# Patient Record
Sex: MALE | Race: WHITE | NOT HISPANIC OR LATINO | Employment: FULL TIME | URBAN - METROPOLITAN AREA
[De-identification: names, ages, dates, MRNs, and addresses within clinical notes are randomized per-mention and may not be internally consistent; named-entity substitution may affect disease eponyms.]

---

## 2019-08-07 ENCOUNTER — HISTORICAL (OUTPATIENT)
Dept: LAB | Facility: HOSPITAL | Age: 54
End: 2019-08-07

## 2019-08-07 LAB
ABS NEUT (OLG): 4.17 X10(3)/MCL (ref 2.1–9.2)
ALBUMIN SERPL-MCNC: 4.2 GM/DL (ref 3.4–5)
ALP SERPL-CCNC: 55 UNIT/L (ref 46–116)
ALT SERPL-CCNC: 18 UNIT/L (ref 12–78)
AST SERPL-CCNC: 16 UNIT/L (ref 15–37)
BASOPHILS # BLD AUTO: 0 X10(3)/MCL (ref 0–0.2)
BASOPHILS NFR BLD AUTO: 0 %
BILIRUB SERPL-MCNC: 0.4 MG/DL (ref 0.2–1)
BILIRUBIN DIRECT+TOT PNL SERPL-MCNC: 0.11 MG/DL (ref 0–0.2)
BILIRUBIN DIRECT+TOT PNL SERPL-MCNC: 0.29 MG/DL (ref 0–0.8)
BUN SERPL-MCNC: 15.7 MG/DL (ref 7–18)
CALCIUM SERPL-MCNC: 9.4 MG/DL (ref 8.5–10.1)
CHLORIDE SERPL-SCNC: 102 MMOL/L (ref 98–107)
CHOLEST SERPL-MCNC: 160 MG/DL (ref 0–200)
CHOLEST/HDLC SERPL: 2.8 {RATIO} (ref 0–5)
CO2 SERPL-SCNC: 27.6 MMOL/L (ref 21–32)
CREAT SERPL-MCNC: 0.93 MG/DL (ref 0.6–1.3)
CREAT/UREA NIT SERPL: 17
EOSINOPHIL # BLD AUTO: 0.3 X10(3)/MCL (ref 0–0.9)
EOSINOPHIL NFR BLD AUTO: 4 %
ERYTHROCYTE [DISTWIDTH] IN BLOOD BY AUTOMATED COUNT: 13 % (ref 11.5–17)
FT4I SERPL CALC-MCNC: 2.31
GLUCOSE SERPL-MCNC: 97 MG/DL (ref 74–106)
HCT VFR BLD AUTO: 44.9 % (ref 42–52)
HDLC SERPL-MCNC: 57 MG/DL (ref 40–60)
HGB BLD-MCNC: 14.5 GM/DL (ref 14–18)
IMM GRANULOCYTES # BLD AUTO: 0.05 % (ref 0–0.02)
IMM GRANULOCYTES NFR BLD AUTO: 0.8 % (ref 0–0.43)
LDLC SERPL CALC-MCNC: 79 MG/DL (ref 0–129)
LYMPHOCYTES # BLD AUTO: 1.4 X10(3)/MCL (ref 0.6–4.6)
LYMPHOCYTES NFR BLD AUTO: 21 %
MCH RBC QN AUTO: 29.4 PG (ref 27–31)
MCHC RBC AUTO-ENTMCNC: 32.3 GM/DL (ref 33–36)
MCV RBC AUTO: 90.9 FL (ref 80–94)
MONOCYTES # BLD AUTO: 0.7 X10(3)/MCL (ref 0.1–1.3)
MONOCYTES NFR BLD AUTO: 10 %
NEUTROPHILS # BLD AUTO: 4.17 X10(3)/MCL (ref 1.4–7.9)
NEUTROPHILS NFR BLD AUTO: 64 %
PLATELET # BLD AUTO: 218 X10(3)/MCL (ref 130–400)
PMV BLD AUTO: 9.7 FL (ref 9.4–12.4)
POTASSIUM SERPL-SCNC: 5.1 MMOL/L (ref 3.5–5.1)
PROT SERPL-MCNC: 7.3 GM/DL (ref 6.4–8.2)
PSA SERPL-MCNC: 2.09 NG/ML (ref 0–4)
RBC # BLD AUTO: 4.94 X10(6)/MCL (ref 4.7–6.1)
SODIUM SERPL-SCNC: 140 MMOL/L (ref 136–145)
T3RU NFR SERPL: 33 % (ref 31–39)
T4 SERPL-MCNC: 7 MCG/DL (ref 4.7–13.3)
TRIGL SERPL-MCNC: 121 MG/DL
TSH SERPL-ACNC: 2.28 MIU/ML (ref 0.36–3.74)
VLDLC SERPL CALC-MCNC: 24 MG/DL
WBC # SPEC AUTO: 6.5 X10(3)/MCL (ref 4.5–11.5)

## 2020-11-06 ENCOUNTER — HISTORICAL (OUTPATIENT)
Dept: LAB | Facility: HOSPITAL | Age: 55
End: 2020-11-06

## 2020-11-06 LAB
ABS NEUT (OLG): 4.54 X10(3)/MCL (ref 2.1–9.2)
ALBUMIN SERPL-MCNC: 4.54 GM/DL (ref 3.4–5)
ALP SERPL-CCNC: 57 UNIT/L (ref 46–116)
ALT SERPL-CCNC: 21 UNIT/L (ref 12–78)
AST SERPL-CCNC: 18 UNIT/L (ref 15–37)
BASOPHILS # BLD AUTO: 0 X10(3)/MCL (ref 0–0.2)
BASOPHILS NFR BLD AUTO: 1 %
BILIRUB SERPL-MCNC: 0.4 MG/DL (ref 0.2–1)
BILIRUBIN DIRECT+TOT PNL SERPL-MCNC: 0.14 MG/DL (ref 0–0.2)
BILIRUBIN DIRECT+TOT PNL SERPL-MCNC: 0.26 MG/DL (ref 0–0.8)
BUN SERPL-MCNC: 14.5 MG/DL (ref 7–18)
CALCIUM SERPL-MCNC: 9 MG/DL (ref 8.5–10.1)
CHLORIDE SERPL-SCNC: 102 MMOL/L (ref 98–107)
CHOLEST SERPL-MCNC: 163 MG/DL (ref 0–200)
CHOLEST/HDLC SERPL: 2.7 {RATIO} (ref 0–5)
CO2 SERPL-SCNC: 27.4 MMOL/L (ref 21–32)
CREAT SERPL-MCNC: 1.07 MG/DL (ref 0.6–1.3)
CREAT/UREA NIT SERPL: 14 MG/DL (ref 12–14)
EOSINOPHIL # BLD AUTO: 0.2 X10(3)/MCL (ref 0–0.9)
EOSINOPHIL NFR BLD AUTO: 4 %
ERYTHROCYTE [DISTWIDTH] IN BLOOD BY AUTOMATED COUNT: 12.7 % (ref 11.5–17)
FT4I SERPL CALC-MCNC: 2.73
GLUCOSE SERPL-MCNC: 96 MG/DL (ref 74–106)
HCT VFR BLD AUTO: 48.2 % (ref 42–52)
HDLC SERPL-MCNC: 61 MG/DL (ref 40–60)
HGB BLD-MCNC: 15.5 GM/DL (ref 14–18)
IMM GRANULOCYTES # BLD AUTO: 0.05 % (ref 0–0.02)
IMM GRANULOCYTES NFR BLD AUTO: 0.7 % (ref 0–0.43)
LDLC SERPL CALC-MCNC: 71 MG/DL (ref 0–129)
LYMPHOCYTES # BLD AUTO: 1.2 X10(3)/MCL (ref 0.6–4.6)
LYMPHOCYTES NFR BLD AUTO: 17 %
MCH RBC QN AUTO: 30 PG (ref 27–31)
MCHC RBC AUTO-ENTMCNC: 32.2 GM/DL (ref 33–36)
MCV RBC AUTO: 93.2 FL (ref 80–94)
MONOCYTES # BLD AUTO: 0.7 X10(3)/MCL (ref 0.1–1.3)
MONOCYTES NFR BLD AUTO: 10 %
NEUTROPHILS # BLD AUTO: 4.54 X10(3)/MCL (ref 1.4–7.9)
NEUTROPHILS NFR BLD AUTO: 67 %
PLATELET # BLD AUTO: 261 X10(3)/MCL (ref 130–400)
PMV BLD AUTO: 10.5 FL (ref 9.4–12.4)
POTASSIUM SERPL-SCNC: 4.7 MMOL/L (ref 3.5–5.1)
PROT SERPL-MCNC: 7.5 GM/DL (ref 6.4–8.2)
PSA SERPL-MCNC: 1.16 NG/ML (ref 0–4)
RBC # BLD AUTO: 5.17 X10(6)/MCL (ref 4.7–6.1)
SODIUM SERPL-SCNC: 139 MMOL/L (ref 136–145)
T3RU NFR SERPL: 35 % (ref 31–39)
T4 SERPL-MCNC: 7.8 MCG/DL (ref 4.7–13.3)
TRIGL SERPL-MCNC: 153 MG/DL
TSH SERPL-ACNC: 1.02 MIU/ML (ref 0.36–3.74)
VLDLC SERPL CALC-MCNC: 31 MG/DL
WBC # SPEC AUTO: 6.8 X10(3)/MCL (ref 4.5–11.5)

## 2022-03-11 ENCOUNTER — HISTORICAL (OUTPATIENT)
Dept: LAB | Facility: HOSPITAL | Age: 57
End: 2022-03-11

## 2022-03-11 LAB
ABS NEUT (OLG): 3.66 (ref 2.1–9.2)
ALBUMIN SERPL-MCNC: 4 G/DL (ref 3.5–5)
ALP SERPL-CCNC: 60 U/L (ref 40–150)
ALT SERPL-CCNC: 33 U/L (ref 0–55)
AST SERPL-CCNC: 33 U/L (ref 5–34)
BASOPHILS # BLD AUTO: 0 10*3/UL (ref 0–0.2)
BASOPHILS NFR BLD AUTO: 1 %
BILIRUB SERPL-MCNC: 0.8 MG/DL
BILIRUBIN DIRECT+TOT PNL SERPL-MCNC: 0.3 (ref 0–0.5)
BILIRUBIN DIRECT+TOT PNL SERPL-MCNC: 0.5 (ref 0–0.8)
BUN SERPL-MCNC: 15.2 MG/DL (ref 8.4–25.7)
CALCIUM SERPL-MCNC: 9.1 MG/DL (ref 8.7–10.5)
CHLORIDE SERPL-SCNC: 103 MMOL/L (ref 98–107)
CHOLEST SERPL-MCNC: 156 MG/DL
CHOLEST/HDLC SERPL: 3 {RATIO} (ref 0–5)
CO2 SERPL-SCNC: 29 MMOL/L (ref 22–29)
CREAT SERPL-MCNC: 0.87 MG/DL (ref 0.73–1.18)
CREAT/UREA NIT SERPL: 17
EOSINOPHIL # BLD AUTO: 0.4 10*3/UL (ref 0–0.9)
EOSINOPHIL NFR BLD AUTO: 6 %
ERYTHROCYTE [DISTWIDTH] IN BLOOD BY AUTOMATED COUNT: 13.3 % (ref 11.5–17)
FT4I SERPL CALC-MCNC: 2.83 (ref 2.6–3.6)
GLUCOSE SERPL-MCNC: 99 MG/DL (ref 74–100)
HCT VFR BLD AUTO: 46.6 % (ref 42–52)
HDLC SERPL-MCNC: 51 MG/DL (ref 35–60)
HEMOLYSIS INTERF INDEX SERPL-ACNC: 6
HEMOLYSIS INTERF INDEX SERPL-ACNC: 6
HGB BLD-MCNC: 15.4 G/DL (ref 14–18)
ICTERIC INTERF INDEX SERPL-ACNC: 1
ICTERIC INTERF INDEX SERPL-ACNC: 1
IMM GRANULOCYTES # BLD AUTO: 0.07 10*3/UL (ref 0–0.02)
IMM GRANULOCYTES NFR BLD AUTO: 1.1 % (ref 0–0.43)
LDLC SERPL CALC-MCNC: 84 MG/DL (ref 50–140)
LIPEMIC INTERF INDEX SERPL-ACNC: 4
LIPEMIC INTERF INDEX SERPL-ACNC: 4
LYMPHOCYTES # BLD AUTO: 1.7 10*3/UL (ref 0.6–4.6)
LYMPHOCYTES NFR BLD AUTO: 26 %
MANUAL DIFF? (OHS): NO
MCH RBC QN AUTO: 29.7 PG (ref 27–31)
MCHC RBC AUTO-ENTMCNC: 33 G/DL (ref 33–36)
MCV RBC AUTO: 90 FL (ref 80–94)
MONOCYTES # BLD AUTO: 0.7 10*3/UL (ref 0.1–1.3)
MONOCYTES NFR BLD AUTO: 11 %
NEUTROPHILS # BLD AUTO: 3.66 10*3/UL (ref 1.4–7.9)
NEUTROPHILS NFR BLD AUTO: 55 %
PLATELET # BLD AUTO: 236 10*3/UL (ref 130–400)
PMV BLD AUTO: 9.8 FL (ref 9.4–12.4)
POTASSIUM SERPL-SCNC: 4.6 MMOL/L (ref 3.5–5.1)
PROT SERPL-MCNC: 7.2 G/DL (ref 6.4–8.3)
PSA SERPL-MCNC: 2.71 NG/ML
RBC # BLD AUTO: 5.18 10*6/UL (ref 4.7–6.1)
SODIUM SERPL-SCNC: 139 MMOL/L (ref 136–145)
T3RU NFR SERPL: 37.48 % (ref 31–39)
T4 SERPL-MCNC: 7.55 UG/DL (ref 4.87–11.72)
TRIGL SERPL-MCNC: 106 MG/DL (ref 34–140)
TSH SERPL-ACNC: 1.24 M[IU]/L (ref 0.35–4.94)
VLDLC SERPL CALC-MCNC: 21 MG/DL
WBC # SPEC AUTO: 6.6 10*3/UL (ref 4.5–11.5)

## 2023-03-06 ENCOUNTER — LAB VISIT (OUTPATIENT)
Dept: LAB | Facility: HOSPITAL | Age: 58
End: 2023-03-06
Attending: FAMILY MEDICINE
Payer: COMMERCIAL

## 2023-03-06 DIAGNOSIS — Z00.00 ROUTINE GENERAL MEDICAL EXAMINATION AT A HEALTH CARE FACILITY: Primary | ICD-10-CM

## 2023-03-06 DIAGNOSIS — Z12.5 SPECIAL SCREENING FOR MALIGNANT NEOPLASM OF PROSTATE: ICD-10-CM

## 2023-03-06 DIAGNOSIS — E78.5 HYPERLIPIDEMIA, UNSPECIFIED HYPERLIPIDEMIA TYPE: ICD-10-CM

## 2023-03-06 DIAGNOSIS — I10 ESSENTIAL HYPERTENSION, MALIGNANT: ICD-10-CM

## 2023-03-06 LAB
ALBUMIN SERPL-MCNC: 4.1 G/DL (ref 3.4–4.8)
ALP SERPL-CCNC: 62 UNIT/L (ref 40–150)
ALT SERPL-CCNC: 26 UNIT/L (ref 0–55)
ANION GAP SERPL CALC-SCNC: 11 MEQ/L
AST SERPL-CCNC: 25 UNIT/L (ref 5–34)
BASOPHILS # BLD AUTO: 0.04 X10(3)/MCL (ref 0–0.2)
BASOPHILS NFR BLD AUTO: 0.5 %
BILIRUBIN DIRECT+TOT PNL SERPL-MCNC: 0.1 MG/DL (ref 0–?)
BILIRUBIN DIRECT+TOT PNL SERPL-MCNC: 0.3 MG/DL (ref 0–0.8)
BILIRUBIN DIRECT+TOT PNL SERPL-MCNC: 0.4 MG/DL
BUN SERPL-MCNC: 15.1 MG/DL (ref 8.4–25.7)
CALCIUM SERPL-MCNC: 9 MG/DL (ref 8.8–10)
CHLORIDE SERPL-SCNC: 105 MMOL/L (ref 98–107)
CHOLEST SERPL-MCNC: 191 MG/DL
CHOLEST/HDLC SERPL: 4 {RATIO} (ref 0–5)
CO2 SERPL-SCNC: 23 MMOL/L (ref 23–31)
CREAT SERPL-MCNC: 0.92 MG/DL (ref 0.73–1.18)
CREAT/UREA NIT SERPL: 16
EOSINOPHIL # BLD AUTO: 0.46 X10(3)/MCL (ref 0–0.9)
EOSINOPHIL NFR BLD AUTO: 6.1 %
ERYTHROCYTE [DISTWIDTH] IN BLOOD BY AUTOMATED COUNT: 12.8 % (ref 11.5–17)
FT4I SERPL CALC-MCNC: 2.54 (ref 2.6–3.6)
GFR SERPLBLD CREATININE-BSD FMLA CKD-EPI: >60 MLS/MIN/1.73/M2
GLUCOSE SERPL-MCNC: 92 MG/DL (ref 82–115)
HCT VFR BLD AUTO: 47 % (ref 42–52)
HDLC SERPL-MCNC: 47 MG/DL (ref 35–60)
HGB BLD-MCNC: 15.5 G/DL (ref 14–18)
IMM GRANULOCYTES # BLD AUTO: 0.11 X10(3)/MCL (ref 0–0.04)
IMM GRANULOCYTES NFR BLD AUTO: 1.5 %
LDLC SERPL CALC-MCNC: 102 MG/DL (ref 50–140)
LYMPHOCYTES # BLD AUTO: 1.84 X10(3)/MCL (ref 0.6–4.6)
LYMPHOCYTES NFR BLD AUTO: 24.5 %
MCH RBC QN AUTO: 29.2 PG
MCHC RBC AUTO-ENTMCNC: 33 G/DL (ref 33–36)
MCV RBC AUTO: 88.5 FL (ref 80–94)
MONOCYTES # BLD AUTO: 0.84 X10(3)/MCL (ref 0.1–1.3)
MONOCYTES NFR BLD AUTO: 11.2 %
NEUTROPHILS # BLD AUTO: 4.21 X10(3)/MCL (ref 2.1–9.2)
NEUTROPHILS NFR BLD AUTO: 56.2 %
PLATELET # BLD AUTO: 253 X10(3)/MCL (ref 130–400)
PMV BLD AUTO: 10.1 FL (ref 7.4–10.4)
POTASSIUM SERPL-SCNC: 4 MMOL/L (ref 3.5–5.1)
PROT SERPL-MCNC: 7.3 GM/DL (ref 5.8–7.6)
PSA SERPL-MCNC: 1.06 NG/ML
RBC # BLD AUTO: 5.31 X10(6)/MCL (ref 4.7–6.1)
SODIUM SERPL-SCNC: 139 MMOL/L (ref 136–145)
T3RU NFR SERPL: 39.39 % (ref 31–39)
T4 SERPL-MCNC: 6.46 UG/DL (ref 4.87–11.72)
TRIGL SERPL-MCNC: 211 MG/DL (ref 34–140)
TSH SERPL-ACNC: 1.86 UIU/ML (ref 0.35–4.94)
VLDLC SERPL CALC-MCNC: 42 MG/DL
WBC # SPEC AUTO: 7.5 X10(3)/MCL (ref 4.5–11.5)

## 2023-03-06 PROCEDURE — 84436 ASSAY OF TOTAL THYROXINE: CPT

## 2023-03-06 PROCEDURE — 80076 HEPATIC FUNCTION PANEL: CPT

## 2023-03-06 PROCEDURE — 84479 ASSAY OF THYROID (T3 OR T4): CPT

## 2023-03-06 PROCEDURE — 84443 ASSAY THYROID STIM HORMONE: CPT

## 2023-03-06 PROCEDURE — 85025 COMPLETE CBC W/AUTO DIFF WBC: CPT

## 2023-03-06 PROCEDURE — 84153 ASSAY OF PSA TOTAL: CPT

## 2023-03-06 PROCEDURE — 36415 COLL VENOUS BLD VENIPUNCTURE: CPT

## 2023-03-06 PROCEDURE — 80061 LIPID PANEL: CPT

## 2023-03-06 PROCEDURE — 80048 BASIC METABOLIC PNL TOTAL CA: CPT

## 2023-04-19 ENCOUNTER — HOSPITAL ENCOUNTER (INPATIENT)
Facility: HOSPITAL | Age: 58
LOS: 2 days | Discharge: HOME | DRG: 603 | End: 2023-04-21
Attending: EMERGENCY MEDICINE | Admitting: STUDENT IN AN ORGANIZED HEALTH CARE EDUCATION/TRAINING PROGRAM
Payer: COMMERCIAL

## 2023-04-19 DIAGNOSIS — R07.9 CHEST PAIN: ICD-10-CM

## 2023-04-19 DIAGNOSIS — L08.9 WOUND INFECTION: Primary | ICD-10-CM

## 2023-04-19 DIAGNOSIS — T14.8XXA WOUND INFECTION: Primary | ICD-10-CM

## 2023-04-19 LAB
ALBUMIN SERPL-MCNC: 4.3 G/DL (ref 3.4–4.8)
ALBUMIN/GLOB SERPL: 1.5 RATIO (ref 1.1–2)
ALP SERPL-CCNC: 67 UNIT/L (ref 40–150)
ALT SERPL-CCNC: 24 UNIT/L (ref 0–55)
AST SERPL-CCNC: 29 UNIT/L (ref 5–34)
BASOPHILS # BLD AUTO: 0.07 X10(3)/MCL (ref 0–0.2)
BASOPHILS NFR BLD AUTO: 1 %
BILIRUBIN DIRECT+TOT PNL SERPL-MCNC: 0.5 MG/DL
BUN SERPL-MCNC: 13.6 MG/DL (ref 8.4–25.7)
CALCIUM SERPL-MCNC: 9 MG/DL (ref 8.8–10)
CHLORIDE SERPL-SCNC: 106 MMOL/L (ref 98–107)
CO2 SERPL-SCNC: 20 MMOL/L (ref 23–31)
CREAT SERPL-MCNC: 0.9 MG/DL (ref 0.73–1.18)
EOSINOPHIL # BLD AUTO: 0.3 X10(3)/MCL (ref 0–0.9)
EOSINOPHIL NFR BLD AUTO: 4.3 %
ERYTHROCYTE [DISTWIDTH] IN BLOOD BY AUTOMATED COUNT: 13.2 % (ref 11.5–17)
GFR SERPLBLD CREATININE-BSD FMLA CKD-EPI: >60 MLS/MIN/1.73/M2
GLOBULIN SER-MCNC: 2.9 GM/DL (ref 2.4–3.5)
GLUCOSE SERPL-MCNC: 118 MG/DL (ref 82–115)
HCT VFR BLD AUTO: 46.1 % (ref 42–52)
HGB BLD-MCNC: 15.3 G/DL (ref 14–18)
IMM GRANULOCYTES # BLD AUTO: 0.04 X10(3)/MCL (ref 0–0.04)
IMM GRANULOCYTES NFR BLD AUTO: 0.6 %
LACTATE SERPL-SCNC: 0.9 MMOL/L (ref 0.5–2.2)
LYMPHOCYTES # BLD AUTO: 1.48 X10(3)/MCL (ref 0.6–4.6)
LYMPHOCYTES NFR BLD AUTO: 21.1 %
MCH RBC QN AUTO: 29.8 PG (ref 27–31)
MCHC RBC AUTO-ENTMCNC: 33.2 G/DL (ref 33–36)
MCV RBC AUTO: 89.7 FL (ref 80–94)
MONOCYTES # BLD AUTO: 0.87 X10(3)/MCL (ref 0.1–1.3)
MONOCYTES NFR BLD AUTO: 12.4 %
NEUTROPHILS # BLD AUTO: 4.24 X10(3)/MCL (ref 2.1–9.2)
NEUTROPHILS NFR BLD AUTO: 60.6 %
NRBC BLD AUTO-RTO: 0 %
PLATELET # BLD AUTO: 240 X10(3)/MCL (ref 130–400)
PMV BLD AUTO: 10.2 FL (ref 7.4–10.4)
POTASSIUM SERPL-SCNC: 4.2 MMOL/L (ref 3.5–5.1)
PROT SERPL-MCNC: 7.2 GM/DL (ref 5.8–7.6)
RBC # BLD AUTO: 5.14 X10(6)/MCL (ref 4.7–6.1)
SODIUM SERPL-SCNC: 136 MMOL/L (ref 136–145)
WBC # SPEC AUTO: 7 X10(3)/MCL (ref 4.5–11.5)

## 2023-04-19 PROCEDURE — 87040 BLOOD CULTURE FOR BACTERIA: CPT | Performed by: NURSE PRACTITIONER

## 2023-04-19 PROCEDURE — 63600175 PHARM REV CODE 636 W HCPCS: Performed by: NURSE PRACTITIONER

## 2023-04-19 PROCEDURE — 99285 EMERGENCY DEPT VISIT HI MDM: CPT

## 2023-04-19 PROCEDURE — 87070 CULTURE OTHR SPECIMN AEROBIC: CPT | Performed by: NURSE PRACTITIONER

## 2023-04-19 PROCEDURE — 11000001 HC ACUTE MED/SURG PRIVATE ROOM

## 2023-04-19 PROCEDURE — 25000003 PHARM REV CODE 250: Performed by: NURSE PRACTITIONER

## 2023-04-19 PROCEDURE — 80053 COMPREHEN METABOLIC PANEL: CPT | Performed by: NURSE PRACTITIONER

## 2023-04-19 PROCEDURE — 85025 COMPLETE CBC W/AUTO DIFF WBC: CPT | Performed by: NURSE PRACTITIONER

## 2023-04-19 PROCEDURE — 83605 ASSAY OF LACTIC ACID: CPT | Performed by: NURSE PRACTITIONER

## 2023-04-19 RX ORDER — ONDANSETRON 2 MG/ML
4 INJECTION INTRAMUSCULAR; INTRAVENOUS EVERY 6 HOURS PRN
Status: DISCONTINUED | OUTPATIENT
Start: 2023-04-20 | End: 2023-04-21 | Stop reason: HOSPADM

## 2023-04-19 RX ORDER — IBUPROFEN 200 MG
24 TABLET ORAL
Status: DISCONTINUED | OUTPATIENT
Start: 2023-04-20 | End: 2023-04-21 | Stop reason: HOSPADM

## 2023-04-19 RX ORDER — ACETAMINOPHEN 325 MG/1
650 TABLET ORAL EVERY 4 HOURS PRN
Status: DISCONTINUED | OUTPATIENT
Start: 2023-04-20 | End: 2023-04-21 | Stop reason: HOSPADM

## 2023-04-19 RX ORDER — DICLOFENAC SODIUM 75 MG/1
75 TABLET, DELAYED RELEASE ORAL 2 TIMES DAILY
Status: ON HOLD | COMMUNITY
Start: 2023-02-02 | End: 2023-04-21 | Stop reason: HOSPADM

## 2023-04-19 RX ORDER — SODIUM CHLORIDE 0.9 % (FLUSH) 0.9 %
10 SYRINGE (ML) INJECTION EVERY 12 HOURS PRN
Status: DISCONTINUED | OUTPATIENT
Start: 2023-04-20 | End: 2023-04-21 | Stop reason: HOSPADM

## 2023-04-19 RX ORDER — HYDROCODONE BITARTRATE AND ACETAMINOPHEN 5; 325 MG/1; MG/1
1 TABLET ORAL EVERY 6 HOURS PRN
Status: DISCONTINUED | OUTPATIENT
Start: 2023-04-20 | End: 2023-04-21 | Stop reason: HOSPADM

## 2023-04-19 RX ORDER — MORPHINE SULFATE 4 MG/ML
4 INJECTION, SOLUTION INTRAMUSCULAR; INTRAVENOUS EVERY 4 HOURS PRN
Status: DISCONTINUED | OUTPATIENT
Start: 2023-04-20 | End: 2023-04-21 | Stop reason: HOSPADM

## 2023-04-19 RX ORDER — SIMETHICONE 80 MG
1 TABLET,CHEWABLE ORAL 4 TIMES DAILY PRN
Status: DISCONTINUED | OUTPATIENT
Start: 2023-04-20 | End: 2023-04-21 | Stop reason: HOSPADM

## 2023-04-19 RX ORDER — NALOXONE HCL 0.4 MG/ML
0.02 VIAL (ML) INJECTION
Status: DISCONTINUED | OUTPATIENT
Start: 2023-04-20 | End: 2023-04-21 | Stop reason: HOSPADM

## 2023-04-19 RX ORDER — TALC
6 POWDER (GRAM) TOPICAL NIGHTLY PRN
Status: DISCONTINUED | OUTPATIENT
Start: 2023-04-20 | End: 2023-04-21 | Stop reason: HOSPADM

## 2023-04-19 RX ORDER — AMLODIPINE AND BENAZEPRIL HYDROCHLORIDE 10; 40 MG/1; MG/1
1 CAPSULE ORAL DAILY
COMMUNITY
Start: 2023-03-15

## 2023-04-19 RX ORDER — IBUPROFEN 200 MG
16 TABLET ORAL
Status: DISCONTINUED | OUTPATIENT
Start: 2023-04-20 | End: 2023-04-21 | Stop reason: HOSPADM

## 2023-04-19 RX ORDER — ENOXAPARIN SODIUM 100 MG/ML
40 INJECTION SUBCUTANEOUS EVERY 24 HOURS
Status: DISCONTINUED | OUTPATIENT
Start: 2023-04-20 | End: 2023-04-21 | Stop reason: HOSPADM

## 2023-04-19 RX ORDER — GLUCAGON 1 MG
1 KIT INJECTION
Status: DISCONTINUED | OUTPATIENT
Start: 2023-04-20 | End: 2023-04-21 | Stop reason: HOSPADM

## 2023-04-19 RX ORDER — VANCOMYCIN HCL IN 5 % DEXTROSE 1G/250ML
2000 PLASTIC BAG, INJECTION (ML) INTRAVENOUS ONCE
Status: COMPLETED | OUTPATIENT
Start: 2023-04-19 | End: 2023-04-19

## 2023-04-19 RX ADMIN — DOXYCYCLINE 100 MG: 100 INJECTION, POWDER, LYOPHILIZED, FOR SOLUTION INTRAVENOUS at 07:04

## 2023-04-19 RX ADMIN — PIPERACILLIN AND TAZOBACTAM 4.5 G: 4; .5 INJECTION, POWDER, LYOPHILIZED, FOR SOLUTION INTRAVENOUS; PARENTERAL at 06:04

## 2023-04-19 RX ADMIN — VANCOMYCIN HYDROCHLORIDE 2000 MG: 1 INJECTION, POWDER, LYOPHILIZED, FOR SOLUTION INTRAVENOUS at 09:04

## 2023-04-19 NOTE — PROGRESS NOTES
Pharmacokinetic Initial Assessment: IV Vancomycin    Assessment/Plan:    Initiate intravenous vancomycin with loading dose of 2000 mg once followed by a maintenance dose of vancomycin 1250mg IV every 12 hours  Desired empiric serum trough concentration is 15 to 20 mcg/mL  Draw vancomycin trough level 60 min prior to fourth dose on 04/21 at approximately 0600  Pharmacy will continue to follow and monitor vancomycin.      Please contact pharmacy at extension [phone] with any questions regarding this assessment.     Thank you for the consult,   Cheyenne Atkinson       Patient brief summary:  Efe Griffin is a 58 y.o. male initiated on antimicrobial therapy with IV Vancomycin for treatment of suspected skin & soft tissue infection    Drug Allergies:   Review of patient's allergies indicates:  No Known Allergies    Actual Body Weight:   92.5 kg    Renal Function:   Estimated Creatinine Clearance: 89.5 mL/min (based on SCr of 0.9 mg/dL).,     Dialysis Method (if applicable):  N/A    CBC (last 72 hours):  Recent Labs   Lab Result Units 04/19/23  1328   WBC x10(3)/mcL 7.0   Hgb g/dL 15.3   Hct % 46.1   Platelet x10(3)/mcL 240   Mono % % 12.4   Eos % % 4.3   Basophil % % 1.0       Metabolic Panel (last 72 hours):  Recent Labs   Lab Result Units 04/19/23  1328   Sodium Level mmol/L 136   Potassium Level mmol/L 4.2   Chloride mmol/L 106   Carbon Dioxide mmol/L 20*   Glucose Level mg/dL 118*   Blood Urea Nitrogen mg/dL 13.6   Creatinine mg/dL 0.90   Albumin Level g/dL 4.3   Bilirubin Total mg/dL 0.5   Alkaline Phosphatase unit/L 67   Aspartate Aminotransferase unit/L 29   Alanine Aminotransferase unit/L 24       Drug levels (last 3 results):  No results for input(s): VANCOMYCINRA, VANCORANDOM, VANCOMYCINPE, VANCOPEAK, VANCOMYCINTR, VANCOTROUGH in the last 72 hours.    Microbiologic Results:  Microbiology Results (last 7 days)       Procedure Component Value Units Date/Time    Wound Culture [[id number]]     Order Status: Sent Specimen:  Wound from Finger, Right Hand     Blood Culture [[id number]] Collected: 04/19/23 1328    Order Status: Resulted Specimen: Blood Updated: 04/19/23 1343    Blood Culture [[id number]] Collected: 04/19/23 1328    Order Status: Resulted Specimen: Blood Updated: 04/19/23 1343

## 2023-04-19 NOTE — FIRST PROVIDER EVALUATION
Medical screening examination initiated.  I have conducted a focused provider triage encounter, findings are as follows:    Brief history of present illness:  Patient that he was bitten by a crawfish x 3 days ago. States right fifth finger redness, swelling, and pain.     Vitals:    04/19/23 1244   BP: (!) 143/90   Pulse: 63   Resp: 18   Temp: 98.4 °F (36.9 °C)   TempSrc: Oral   SpO2: 97%       Pertinent physical exam:  Awake, alert, ambulatory      Brief workup plan:  Labs, Imaging.    Preliminary workup initiated; this workup will be continued and followed by the physician or advanced practice provider that is assigned to the patient when roomed.

## 2023-04-19 NOTE — ED PROVIDER NOTES
Encounter Date: 4/19/2023       History     Chief Complaint   Patient presents with    Wound Infection     POV with wound to R pinky finger from crawfishing equipment. Denies fevers. Seen at clinic today, given tetanus, and told to go to ER for potential I&D     See MDM    The history is provided by the patient. No  was used.   Review of patient's allergies indicates:  No Known Allergies  Past Medical History:   Diagnosis Date    Hypertension      Past Surgical History:   Procedure Laterality Date    KNEE SURGERY      SINUS SURGERY       History reviewed. No pertinent family history.  Social History     Tobacco Use    Smoking status: Former     Types: Cigarettes    Smokeless tobacco: Never   Substance Use Topics    Alcohol use: Yes     Alcohol/week: 6.0 standard drinks     Types: 6 Cans of beer per week     Comment: daily    Drug use: Never     Review of Systems   Constitutional:  Negative for fever.   Respiratory:  Negative for cough and shortness of breath.    Cardiovascular:  Negative for chest pain.   Gastrointestinal:  Negative for abdominal pain.   Genitourinary:  Negative for difficulty urinating and dysuria.   Musculoskeletal:  Negative for gait problem.   Skin:  Negative for color change.   Neurological:  Negative for dizziness, speech difficulty and headaches.   Psychiatric/Behavioral:  Negative for hallucinations and suicidal ideas.    All other systems reviewed and are negative.    Physical Exam     Initial Vitals [04/19/23 1244]   BP Pulse Resp Temp SpO2   (!) 143/90 63 18 98.4 °F (36.9 °C) 97 %      MAP       --         Physical Exam    Nursing note and vitals reviewed.  Constitutional: He appears well-developed and well-nourished.   HENT:   Head: Normocephalic.   Eyes: EOM are normal.   Neck: Neck supple.   Normal range of motion.  Cardiovascular:  Normal rate, regular rhythm, normal heart sounds and intact distal pulses.           Pulmonary/Chest: Breath sounds normal.    Abdominal: Abdomen is soft. Bowel sounds are normal.   Musculoskeletal:         General: Normal range of motion.      Cervical back: Normal range of motion and neck supple.     Neurological: He is alert and oriented to person, place, and time. He has normal strength.   Skin: Skin is warm and dry. Capillary refill takes less than 2 seconds.   Swelling and redness to right little finger    Psychiatric: He has a normal mood and affect. His behavior is normal. Judgment and thought content normal.       ED Course   Procedures  Labs Reviewed   COMPREHENSIVE METABOLIC PANEL - Abnormal; Notable for the following components:       Result Value    Carbon Dioxide 20 (*)     Glucose Level 118 (*)     All other components within normal limits   LACTIC ACID, PLASMA - Normal   BLOOD CULTURE [facility]   BLOOD CULTURE [facility]   WOUND CULTURE ([facility])   CBC W/ AUTO DIFFERENTIAL    Narrative:     The following orders were created for panel order CBC Auto Differential.  Procedure                               Abnormality         Status                     ---------                               -----------         ------                     CBC with Differential[[id number]]                            Final result                 Please view results for these tests on the individual orders.   CBC WITH DIFFERENTIAL          Imaging Results              X-Ray Hand 3 view Right (Final result)  Result time 04/19/23 13:50:49      Final result by Rodolfo Nelson MD (04/19/23 13:50:49)                   Impression:      No acute osseous abnormality, fracture, or dislocation.    Mild degenerative changes.  Predominantly of the proximal interphalangeal joints.      Electronically signed by: Rodolfo Nelson  Date:    04/19/2023  Time:    13:50               Narrative:    EXAMINATION:  XR HAND COMPLETE 3 VIEW RIGHT    CLINICAL HISTORY:  Swelling;    TECHNIQUE:  Radiographs of the right hand with AP, lateral and oblique  views.    COMPARISON:  No prior  imaging available for comparison    FINDINGS:  There is no acute fracture, subluxation or dislocation.    Degenerative changes with osteophytes greatest of the 5th proximal interphalangeal joint.    Additional osteophytes are noted about the 3rd and 4th proximal interphalangeal joints.    Osseous structures show normal bone mineral density.    Soft tissues are unremarkable.    There are no radiopaque foreign bodies.                                       Medications   piperacillin-tazobactam (ZOSYN) 4.5 g in dextrose 5 % in water (D5W) 5 % 100 mL IVPB (MB+) (has no administration in time range)   doxycycline (VIBRAMYCIN) 100mg in dextrose 5% 250 mL IVPB (ready to mix) (has no administration in time range)   piperacillin-tazobactam (ZOSYN) 4.5 g in dextrose 5 % in water (D5W) 5 % 100 mL IVPB (MB+) (has no administration in time range)   vancomycin - pharmacy to dose (has no administration in time range)   vancomycin in dextrose 5 % 1 gram/250 mL IVPB 2,000 mg (has no administration in time range)   vancomycin 1.25 g in dextrose 5% 250 mL IVPB (ready to mix) (has no administration in time range)     Medical Decision Making:   Initial Assessment:   Historian:  Patient.  Patient is a 58-year-old male  that presents with infection to right little finger that has been present started 6 days ago. Associated symptoms nothing. Surrounding information is patient was crawfishing and had an injury to the finger. Exacerbated by nothing. Relieved by nothing. Patient treatment prior to arrival none. Risk factors include none. Other history pertaining to this complaint nothing.   Assessment:  See physical exam.    Differential Diagnosis:   Finger cellulitis, finger abscess, flexor tenosynovitis  ED Management:  History was obtained.  Physical was performed.  I did speak to orthopedist Dr. Flores who is on for hand call.  He diverted this to plastics.  I spoke to Dr. Gonzalez plastic surgeon and he accepts consult on this patient.  He  would like patient admitted to Hospital Medicine.  Due to the brackish water I did speak to the pharmacist.  Culture was obtained from the infected finger.  Blood cultures were drawn.  We will start patient on vancomycin, Zosyn, and doxycycline.  Spoke to Madi with Hospital Medicine and they did accept the admission.  No social determinants that affect his healthcare were noted.  Discussed case with emergency room physician Dr Sharma.  She will see the patient and she agrees with admission          Attending Attestation:     Physician Attestation Statement for NP/PA:   I have conducted a face to face encounter with this patient in addition to the NP/PA, due to Medical Complexity    Other NP/PA Attestation Additions:    History of Present Illness: See ed course             ED Course as of 04/19/23 1820 Wed Apr 19, 2023 1807 Dr. Sharma supervisory attestation  I performed substantive portion of MDM. I performed a history, physical exam, reviewed pertinent available previous records and discussed plan of care with NP I had face to face time evaluation of the patient    HPI: 57 yo M with h/o HTN, HLD here fore evaluation of right pinky swellinga fter crawfishing Friday and sustaining puncture wound. Wrosening over several days, multiple infections in the past, no fever, able to range his hand  PE:normocephalic, atraumatic  Regular rate, lungs clear, good cap refill  Right fifth digit with dorsal erythema, induration, some purple discloration, no fusiform swelling, no canaval signs  MDM:plastics recommends admit to medicine. Vanc, zosyn and doxy to cover for organisms in brackish water   [BS]      ED Course User Index  [BS] Monica Sharma MD                   Clinical Impression:   Final diagnoses:  [T14.8XXA, L08.9] Wound infection - Right little finger (Primary)        ED Disposition Condition    Admit Stable                JOSE ANGEL Donato  04/19/23 1820       Monica Sharma MD  04/19/23  1911

## 2023-04-20 LAB
ALBUMIN SERPL-MCNC: 3.7 G/DL (ref 3.4–4.8)
ALBUMIN/GLOB SERPL: 1.5 RATIO (ref 1.1–2)
ALP SERPL-CCNC: 57 UNIT/L (ref 40–150)
ALT SERPL-CCNC: 22 UNIT/L (ref 0–55)
AST SERPL-CCNC: 22 UNIT/L (ref 5–34)
BASOPHILS # BLD AUTO: 0.04 X10(3)/MCL (ref 0–0.2)
BASOPHILS NFR BLD AUTO: 0.6 %
BILIRUBIN DIRECT+TOT PNL SERPL-MCNC: 0.3 MG/DL
BUN SERPL-MCNC: 14.1 MG/DL (ref 8.4–25.7)
CALCIUM SERPL-MCNC: 8.9 MG/DL (ref 8.8–10)
CHLORIDE SERPL-SCNC: 107 MMOL/L (ref 98–107)
CO2 SERPL-SCNC: 25 MMOL/L (ref 23–31)
CREAT SERPL-MCNC: 0.9 MG/DL (ref 0.73–1.18)
EOSINOPHIL # BLD AUTO: 0.3 X10(3)/MCL (ref 0–0.9)
EOSINOPHIL NFR BLD AUTO: 4.6 %
ERYTHROCYTE [DISTWIDTH] IN BLOOD BY AUTOMATED COUNT: 13.2 % (ref 11.5–17)
GFR SERPLBLD CREATININE-BSD FMLA CKD-EPI: >60 MLS/MIN/1.73/M2
GLOBULIN SER-MCNC: 2.4 GM/DL (ref 2.4–3.5)
GLUCOSE SERPL-MCNC: 105 MG/DL (ref 82–115)
HCT VFR BLD AUTO: 44.3 % (ref 42–52)
HGB BLD-MCNC: 14.5 G/DL (ref 14–18)
IMM GRANULOCYTES # BLD AUTO: 0.03 X10(3)/MCL (ref 0–0.04)
IMM GRANULOCYTES NFR BLD AUTO: 0.5 %
LYMPHOCYTES # BLD AUTO: 1.66 X10(3)/MCL (ref 0.6–4.6)
LYMPHOCYTES NFR BLD AUTO: 25.3 %
MCH RBC QN AUTO: 29.7 PG (ref 27–31)
MCHC RBC AUTO-ENTMCNC: 32.7 G/DL (ref 33–36)
MCV RBC AUTO: 90.6 FL (ref 80–94)
MONOCYTES # BLD AUTO: 0.9 X10(3)/MCL (ref 0.1–1.3)
MONOCYTES NFR BLD AUTO: 13.7 %
MRSA PCR SCRN (OHS): NOT DETECTED
NEUTROPHILS # BLD AUTO: 3.64 X10(3)/MCL (ref 2.1–9.2)
NEUTROPHILS NFR BLD AUTO: 55.3 %
NRBC BLD AUTO-RTO: 0 %
PLATELET # BLD AUTO: 229 X10(3)/MCL (ref 130–400)
PMV BLD AUTO: 10 FL (ref 7.4–10.4)
POTASSIUM SERPL-SCNC: 4.2 MMOL/L (ref 3.5–5.1)
PROT SERPL-MCNC: 6.1 GM/DL (ref 5.8–7.6)
RBC # BLD AUTO: 4.89 X10(6)/MCL (ref 4.7–6.1)
SODIUM SERPL-SCNC: 139 MMOL/L (ref 136–145)
WBC # SPEC AUTO: 6.6 X10(3)/MCL (ref 4.5–11.5)

## 2023-04-20 PROCEDURE — 85025 COMPLETE CBC W/AUTO DIFF WBC: CPT | Performed by: NURSE PRACTITIONER

## 2023-04-20 PROCEDURE — 99223 1ST HOSP IP/OBS HIGH 75: CPT | Mod: ,,, | Performed by: SURGERY

## 2023-04-20 PROCEDURE — 80053 COMPREHEN METABOLIC PANEL: CPT | Performed by: NURSE PRACTITIONER

## 2023-04-20 PROCEDURE — 63600175 PHARM REV CODE 636 W HCPCS: Performed by: NURSE PRACTITIONER

## 2023-04-20 PROCEDURE — 99223 PR INITIAL HOSPITAL CARE,LEVL III: ICD-10-PCS | Mod: ,,, | Performed by: SURGERY

## 2023-04-20 PROCEDURE — 25000003 PHARM REV CODE 250: Performed by: INTERNAL MEDICINE

## 2023-04-20 PROCEDURE — 11000001 HC ACUTE MED/SURG PRIVATE ROOM

## 2023-04-20 PROCEDURE — 87641 MR-STAPH DNA AMP PROBE: CPT | Performed by: INTERNAL MEDICINE

## 2023-04-20 PROCEDURE — 25000003 PHARM REV CODE 250: Performed by: NURSE PRACTITIONER

## 2023-04-20 RX ORDER — DOXYCYCLINE HYCLATE 100 MG
100 TABLET ORAL EVERY 12 HOURS
Status: DISCONTINUED | OUTPATIENT
Start: 2023-04-20 | End: 2023-04-21

## 2023-04-20 RX ADMIN — DOXYCYCLINE HYCLATE 100 MG: 100 TABLET, COATED ORAL at 08:04

## 2023-04-20 RX ADMIN — VANCOMYCIN HYDROCHLORIDE 1250 MG: 1.25 INJECTION, POWDER, LYOPHILIZED, FOR SOLUTION INTRAVENOUS at 06:04

## 2023-04-20 RX ADMIN — PIPERACILLIN AND TAZOBACTAM 4.5 G: 4; .5 INJECTION, POWDER, FOR SOLUTION INTRAVENOUS; PARENTERAL at 03:04

## 2023-04-20 RX ADMIN — DOXYCYCLINE 100 MG: 100 INJECTION, POWDER, LYOPHILIZED, FOR SOLUTION INTRAVENOUS at 08:04

## 2023-04-20 RX ADMIN — PIPERACILLIN AND TAZOBACTAM 4.5 G: 4; .5 INJECTION, POWDER, FOR SOLUTION INTRAVENOUS; PARENTERAL at 07:04

## 2023-04-20 RX ADMIN — PIPERACILLIN AND TAZOBACTAM 4.5 G: 4; .5 INJECTION, POWDER, FOR SOLUTION INTRAVENOUS; PARENTERAL at 12:04

## 2023-04-20 RX ADMIN — ENOXAPARIN SODIUM 40 MG: 40 INJECTION SUBCUTANEOUS at 04:04

## 2023-04-20 NOTE — NURSING
Nurses Note -- 4 Eyes      4/19/2023   11:28 PM      Skin assessed during: Admit      [x] No Pressure Injuries Present    []Prevention Measures Documented      [] Yes- Altered Skin Integrity Present or Discovered   [] LDA Added if Not in Epic (Describe Wound)   [] New Altered Skin Integrity was Present on Admit and Documented in LDA   [] Wound Image Taken    Wound Care Consulted? No    Attending Nurse:  Venessa Hilton RN     Second RN/Staff Member:  Awilda Harmon RN

## 2023-04-20 NOTE — SUBJECTIVE & OBJECTIVE
No current facility-administered medications on file prior to encounter.     Current Outpatient Medications on File Prior to Encounter   Medication Sig    amLODIPine-benazepriL (LOTREL) 10-40 mg per capsule Take 1 capsule by mouth once daily.    aspirin 81 mg Cap Take 81 mg by mouth once daily.    atorvastatin calcium (LIPITOR ORAL) Take 10 mg by mouth once.    diclofenac (VOLTAREN) 75 MG EC tablet Take 75 mg by mouth 2 (two) times a day.       Review of patient's allergies indicates:  No Known Allergies    Past Medical History:   Diagnosis Date    Hypercholesterolemia     Hypertension      Past Surgical History:   Procedure Laterality Date    KNEE SURGERY      SINUS SURGERY       Family History    None       Tobacco Use    Smoking status: Former     Types: Cigarettes    Smokeless tobacco: Never   Substance and Sexual Activity    Alcohol use: Yes     Alcohol/week: 6.0 standard drinks     Types: 6 Cans of beer per week     Comment: daily    Drug use: Never    Sexual activity: Not on file     Review of Systems  Negative except as detailed in HPI, all other systems reviewed and negative.     Objective:     Vital Signs (Most Recent):  Temp: 97.9 °F (36.6 °C) (04/20/23 0505)  Pulse: (!) 51 (04/20/23 0505)  Resp: 18 (04/19/23 2306)  BP: (!) 166/88 (04/20/23 0505)  SpO2: 96 % (04/20/23 0505)   Vital Signs (24h Range):  Temp:  [97.7 °F (36.5 °C)-98.4 °F (36.9 °C)] 97.9 °F (36.6 °C)  Pulse:  [50-63] 51  Resp:  [16-18] 18  SpO2:  [95 %-99 %] 96 %  BP: (129-166)/(72-93) 166/88     Weight: 88.8 kg (195 lb 12.3 oz)  Body mass index is 33.6 kg/m².    Physical Exam  Constitutional:       Appearance: Normal appearance.      Comments: Pleasant male, NAD   HENT:      Head: Normocephalic and atraumatic.   Eyes:      Extraocular Movements: Extraocular movements intact.   Pulmonary:      Effort: Pulmonary effort is normal.   Musculoskeletal:         General: Normal range of motion.      Cervical back: Normal range of motion.   Skin:      General: Skin is warm and dry.      Capillary Refill: Capillary refill takes less than 2 seconds.      Comments: Right 5th finger with a small opening laterally, no purulent drainage, no fluctuance, erythema with some blistering to the finger. Sensation and ROM intact.    Neurological:      General: No focal deficit present.      Mental Status: He is oriented to person, place, and time.                 Significant Labs:  CBC:   Recent Labs   Lab 04/20/23  0351   WBC 6.6   RBC 4.89   HGB 14.5   HCT 44.3      MCV 90.6   MCH 29.7   MCHC 32.7*     BMP:   Recent Labs   Lab 04/20/23  0351      K 4.2   CO2 25   BUN 14.1   CREATININE 0.90   CALCIUM 8.9       Significant Diagnostics:  Xray right hand 4/19/23:    Impression:     No acute osseous abnormality, fracture, or dislocation.     Mild degenerative changes.  Predominantly of the proximal interphalangeal joints.    ASSESSMENT:   Right 5th finger cellulitis   HTN  HLD    PLAN:  Assessed right 5th finger, cellulitis with small opening, do not appreciate any drainable fluid collection.   No indication for surgery at this time.   Monitor clinically, currently no WBCs or fever.   Recommend antibiotics per primary pending culture results.   Recommend daily warm/soapy water soaks followed by wrapping hand and elevation to assist with edema.

## 2023-04-20 NOTE — PLAN OF CARE
Problem: Adult Inpatient Plan of Care  Goal: Plan of Care Review  Outcome: Ongoing, Progressing  Goal: Patient-Specific Goal (Individualized)  Outcome: Ongoing, Progressing  Flowsheets (Taken 4/20/2023 1253)  Anxieties, Fears or Concerns: none at this time  Individualized Care Needs: na  Goal: Absence of Hospital-Acquired Illness or Injury  Outcome: Ongoing, Progressing  Goal: Optimal Comfort and Wellbeing  Outcome: Ongoing, Progressing  Intervention: Provide Person-Centered Care  Flowsheets (Taken 4/20/2023 1253)  Trust Relationship/Rapport: care explained  Goal: Readiness for Transition of Care  Outcome: Ongoing, Progressing     Problem: Impaired Wound Healing  Goal: Optimal Wound Healing  Outcome: Ongoing, Progressing     Problem: Infection  Goal: Absence of Infection Signs and Symptoms  Outcome: Ongoing, Progressing  Intervention: Prevent or Manage Infection  Flowsheets (Taken 4/20/2023 1253)  Fever Reduction/Comfort Measures:   lightweight bedding   lightweight clothing  Isolation Precautions: protective

## 2023-04-20 NOTE — PLAN OF CARE
04/20/23 1144   Discharge Assessment   Assessment Type Discharge Planning Assessment   Confirmed/corrected address, phone number and insurance Yes   Confirmed Demographics Correct on Facesheet   Source of Information patient;family   When was your last doctors appointment?   (Pt states last visit was 3 months ago)   Reason For Admission wound infection   People in Home significant other   Do you expect to return to your current living situation? Yes   Do you have help at home or someone to help you manage your care at home? Yes   Who are your caregiver(s) and their phone number(s)? Pt lives with significant other Camille [phone]   Current cognitive status: Alert/Oriented;No Deficits   Equipment Currently Used at Home none   Do you currently have service(s) that help you manage your care at home? No   Do you take prescription medications? Yes   Do you have prescription coverage? Yes   Do you have any problems affording any of your prescribed medications? No   Is the patient taking medications as prescribed? yes   Who is going to help you get home at discharge? Camille   How do you get to doctors appointments? car, drives self   Are you on dialysis? No   Do you take coumadin? No   Discharge Plan A Home with family   Discharge Plan B Home with family   Discharge Plan discussed with: Patient;Spouse/sig other   Name(s) and Number(s) Camille [phone]   Discharge Barriers Identified None     Pt states he lives with significant other in a single level home with no steps to enter. He states he is independent with ADL's prior to admission. He does not have DME and is not active with a home health agency. Will follow for discharge needs.

## 2023-04-20 NOTE — H&P
[facility] Hospital Medicine History & Physical Examination       Patient Name: Efe Griffin  MRN: [id number]  Patient Class: IP- Inpatient   Admission Date: 4/19/2023   Admitting Physician: ELIZABETH Service   Length of Stay: 0  Attending Physician: Dr. David Pablo  Primary Care Provider: Mickey Sloan Jr, MD  Face-to-Face encounter date: 04/19/2023  Code Status:Full Code  Chief Complaint: Wound Infection (POV with wound to R pinky finger from crawfishing equipment. Denies fevers. Seen at clinic today, given tetanus, and told to go to ER for potential I&D)        Patient information was obtained from patient, patient's family, past medical records and ER records.     HISTORY OF PRESENT ILLNESS:   Efe Griffin is a 58 y.o. male who past medical history includes HTN, HLD; presents to the ED at [facility] on 4/19/2023 with a primary complaint of  right 5th finger wound infection. Pt reports injuring his right 5 th finger with crawfish equipment. Pt was seen at his PCP which he received tetanus shot and was directed to come to the ED for further evaluation. Pt reports he injure his 5th finger about 6 days ago which he reports the wound started to worsening over the past few days which he went to his PCP and was directed to come to the ED for evaluation. ED provider spoke to orthopedic on call which patient was referred to plastics who will see patient in consult. Labs demonstrated glucose 118, other indicis unremarkable.  X-ray of right hand demonstrated no acute osseous abnormality, fracture, or dislocation, mild degenerative changes predominantly of the proximal interphalangeal joints.    Initial vital signs /90 pulse 63 respirations 18 temperature 98.4° F O2 saturation 97% on room air.  Wound culture and blood cultures were collected in the ED. patient was started on IV doxycycline, Zosyn, and vancomycin therapy, pharmacy has been consulted for dosing.  Per ED provider plastics  would like patient NPO after midnight for surgical intervention.  Patient is admitted to hospital medicine services for further management.    PAST MEDICAL HISTORY:     Past Medical History:   Diagnosis Date    Hypercholesterolemia     Hypertension        PAST SURGICAL HISTORY:     Past Surgical History:   Procedure Laterality Date    KNEE SURGERY      SINUS SURGERY         ALLERGIES:   Patient has no known allergies.    FAMILY HISTORY:   Reviewed and negative    SOCIAL HISTORY:     Social History     Tobacco Use    Smoking status: Former     Types: Cigarettes    Smokeless tobacco: Never   Substance Use Topics    Alcohol use: Yes     Alcohol/week: 6.0 standard drinks     Types: 6 Cans of beer per week     Comment: daily        HOME MEDICATIONS:   As documented  Prior to Admission medications    Medication Sig Start Date End Date Taking? Authorizing Provider   amLODIPine-benazepriL (LOTREL) 10-40 mg per capsule Take 1 capsule by mouth once daily. 3/15/23  Yes Historical Provider   aspirin 81 mg Cap Take 81 mg by mouth once daily.   Yes Historical Provider   atorvastatin calcium (LIPITOR ORAL) Take 10 mg by mouth once.   Yes Historical Provider   diclofenac (VOLTAREN) 75 MG EC tablet Take 75 mg by mouth 2 (two) times a day. 2/2/23  Yes Historical Provider       REVIEW OF SYSTEMS:   Except as documented, all other systems reviewed and negative     PHYSICAL EXAM:     VITAL SIGNS: 24 HRS MIN & MAX LAST   Temp  Min: 97.7 °F (36.5 °C)  Max: 98.4 °F (36.9 °C) 97.7 °F (36.5 °C)   BP  Min: 129/72  Max: 163/93 (!) 163/93     Pulse  Min: 50  Max: 63  60   Resp  Min: 16  Max: 18 18   SpO2  Min: 95 %  Max: 99 % 95 %       General appearance: Well-developed, well-nourished male in no apparent distress.  HENT: Atraumatic head. Moist mucous membranes of oral cavity.  Eyes: PERRL  Neck: Supple.   Lungs: Clear to auscultation bilaterally. No wheezing present.   Heart: Regular rate and rhythm. S1 and S2 present cap refill  brisk  Abdomen: Soft, non-distended, non-tender. No rebound tenderness/guarding. Bowel sounds are normal.   Extremities: No cyanosis, clubbing, VILLARREAL  Skin: right hand 5th finger wound with drainage, erythema TTP  Neuro: AAOx3, no focal deficits  Psych/mental status: Appropriate mood and affect. Responds appropriately to questions.     LABS AND IMAGING:     Recent Labs   Lab 04/19/23  1328   WBC 7.0   RBC 5.14   HGB 15.3   HCT 46.1   MCV 89.7   MCH 29.8   MCHC 33.2   RDW 13.2      MPV 10.2       Recent Labs   Lab 04/19/23  1328      K 4.2   CO2 20*   BUN 13.6   CREATININE 0.90   CALCIUM 9.0   ALBUMIN 4.3   ALKPHOS 67   ALT 24   AST 29   BILITOT 0.5       Microbiology Results (last 7 days)       Procedure Component Value Units Date/Time    Wound Culture [[id number]] Collected: 04/19/23 1830    Order Status: Sent Specimen: Wound from Finger, Right Hand Updated: 04/19/23 1847    Blood Culture [[id number]] Collected: 04/19/23 1328    Order Status: Resulted Specimen: Blood Updated: 04/19/23 1343    Blood Culture [[id number]] Collected: 04/19/23 1328    Order Status: Resulted Specimen: Blood Updated: 04/19/23 1343             X-Ray Hand 3 view Right  Narrative: EXAMINATION:  XR HAND COMPLETE 3 VIEW RIGHT    CLINICAL HISTORY:  Swelling;    TECHNIQUE:  Radiographs of the right hand with AP, lateral and oblique  views.    COMPARISON:  No prior imaging available for comparison    FINDINGS:  There is no acute fracture, subluxation or dislocation.    Degenerative changes with osteophytes greatest of the 5th proximal interphalangeal joint.    Additional osteophytes are noted about the 3rd and 4th proximal interphalangeal joints.    Osseous structures show normal bone mineral density.    Soft tissues are unremarkable.    There are no radiopaque foreign bodies.  Impression: No acute osseous abnormality, fracture, or dislocation.    Mild degenerative changes.  Predominantly of the proximal interphalangeal  joints.    Electronically signed by: Rodolfo Leslie  Date:    04/19/2023  Time:    13:50        ASSESSMENT & PLAN:   ASSESSMENT:  Right 5th finger wound infection- after injury in brackish water crawfishing, POA  Right hand/finger pain- acute, POA  Acute right hand, 5th finger cellulitis- secondary to to wound infection, POA  HTN- essential  HLD    PLAN:  Plastic surgery Services have been consulted appreciate assistance and recommendations  Follow cultures and sensitivities  Continue with IV antibiotic therapy   PRN pain management   Repeat lab work in a.m.  Resume home medication as deemed necessary   CSM and neurovascular checks to right hand & 5 th finger    VTE Prophylaxis: Lovenox  for DVT prophylaxis and will be advised to be as mobile as possible and sit in a chair as tolerated    Patient condition:  Stable    I, JOSE ANGEL Parson have reviewed and discussed the case with Dr. David Pablo.  Please see the following addendum for further assessment and plan from there attending MD.  JOSE ANGEL Ibarra   04/19/2023      ________________________________________________________________________  I, Dr. David Pablo assumed care of this patient.  For the patient encounter, I performed the substantive portion of the visit, I reviewed the NPPA documentation, treatment plan, and medical decision making.  I had face to face time with this patient.  I have personally reviewed the labs and test results that are presently available. I have reviewed or attempted to review medical records based upon their availability. If patient was admitted under observational status it is with my approval.      Pleasant 58-year-old male with a right 5th finger infection, brackish water, worsening over 3 days.  His digit is extremely erythematous and swollen.  Continue vanc/Zosyn/doxy, consultation to plastics.    Time seen: 11pm 4/19/23  David Pablo MD

## 2023-04-20 NOTE — HPI
HPI: Patient is a 58 y.o. male who past medical history includes HTN, HLD. Presented to the ED at [facility] on 4/19/2023 with a primary complaint of  right 5th finger wound infection. Pt reports injuring his right 5 th finger while working in [employer] [place] about 6 days prior to admission.  Pt was seen at his PCP which he received tetanus shot and was directed to come to the ED for further evaluation.  ED provider spoke to orthopedic on call which patient was referred to plastics who will see patient in consult. Labs demonstrated glucose 118, other indicis unremarkable.  X-ray of right hand demonstrated no acute osseous abnormality, fracture, or dislocation, mild degenerative changes predominantly of the proximal interphalangeal joints. Wound culture and blood cultures were collected in the ED. patient was started on IV doxycycline, Zosyn, and vancomycin.     Today: patient seen for initial consult. Patient reports some symptomatic improvement since starting IV abx.

## 2023-04-20 NOTE — CONSULTS
[facility] - 8th Floor Med Surg  Plastic Surgery  Consult Note    Patient Name: Efe Griffin  MRN: [id number]  Code Status: Full Code  Admission Date: 4/19/2023  Hospital Length of Stay: 1 days  Attending Physician: Gilbert Almanza MD  Primary Care Provider: Mickey Sloan Jr, MD        Inpatient consult to Plastic Surgery  Consult performed by: JOSE ANGEL Howard  Consult ordered by: JOSE ANGEL Donato        Subjective:      HPI: Patient is a 58 y.o. male who past medical history includes HTN, HLD. Presented to the ED at [facility] on 4/19/2023 with a primary complaint of  right 5th finger wound infection. Pt reports injuring his right 5 th finger while working in [employer] [place] about 6 days prior to admission.  Pt was seen at his PCP which he received tetanus shot and was directed to come to the ED for further evaluation.  ED provider spoke to orthopedic on call which patient was referred to plastics who will see patient in consult. Labs demonstrated glucose 118, other indicis unremarkable.  X-ray of right hand demonstrated no acute osseous abnormality, fracture, or dislocation, mild degenerative changes predominantly of the proximal interphalangeal joints. Wound culture and blood cultures were collected in the ED. patient was started on IV doxycycline, Zosyn, and vancomycin.     Today: patient seen for initial consult. Patient reports some symptomatic improvement since starting IV abx.       No current facility-administered medications on file prior to encounter.     Current Outpatient Medications on File Prior to Encounter   Medication Sig    amLODIPine-benazepriL (LOTREL) 10-40 mg per capsule Take 1 capsule by mouth once daily.    aspirin 81 mg Cap Take 81 mg by mouth once daily.    atorvastatin calcium (LIPITOR ORAL) Take 10 mg by mouth once.    diclofenac (VOLTAREN) 75 MG EC tablet Take 75 mg by mouth 2 (two) times a day.       Review of patient's allergies indicates:  No Known  Allergies    Past Medical History:   Diagnosis Date    Hypercholesterolemia     Hypertension      Past Surgical History:   Procedure Laterality Date    KNEE SURGERY      SINUS SURGERY       Family History    None       Tobacco Use    Smoking status: Former     Types: Cigarettes    Smokeless tobacco: Never   Substance and Sexual Activity    Alcohol use: Yes     Alcohol/week: 6.0 standard drinks     Types: 6 Cans of beer per week     Comment: daily    Drug use: Never    Sexual activity: Not on file     Review of Systems  Negative except as detailed in HPI, all other systems reviewed and negative.     Objective:     Vital Signs (Most Recent):  Temp: 97.9 °F (36.6 °C) (04/20/23 0505)  Pulse: (!) 51 (04/20/23 0505)  Resp: 18 (04/19/23 2306)  BP: (!) 166/88 (04/20/23 0505)  SpO2: 96 % (04/20/23 0505)   Vital Signs (24h Range):  Temp:  [97.7 °F (36.5 °C)-98.4 °F (36.9 °C)] 97.9 °F (36.6 °C)  Pulse:  [50-63] 51  Resp:  [16-18] 18  SpO2:  [95 %-99 %] 96 %  BP: (129-166)/(72-93) 166/88     Weight: 88.8 kg (195 lb 12.3 oz)  Body mass index is 33.6 kg/m².    Physical Exam  Constitutional:       Appearance: Normal appearance.      Comments: Pleasant male, NAD   HENT:      Head: Normocephalic and atraumatic.   Eyes:      Extraocular Movements: Extraocular movements intact.   Pulmonary:      Effort: Pulmonary effort is normal.   Musculoskeletal:         General: Normal range of motion.      Cervical back: Normal range of motion.   Skin:     General: Skin is warm and dry.      Capillary Refill: Capillary refill takes less than 2 seconds.      Comments: Right 5th finger with a small opening laterally, no purulent drainage, no fluctuance, erythema with some blistering to the finger. Sensation and ROM intact.    Neurological:      General: No focal deficit present.      Mental Status: He is oriented to person, place, and time.                 Significant Labs:  CBC:   Recent Labs   Lab 04/20/23  0351   WBC 6.6   RBC 4.89    HGB 14.5   HCT 44.3      MCV 90.6   MCH 29.7   MCHC 32.7*     BMP:   Recent Labs   Lab 04/20/23  0351      K 4.2   CO2 25   BUN 14.1   CREATININE 0.90   CALCIUM 8.9       Significant Diagnostics:  Xray right hand 4/19/23:    Impression:     No acute osseous abnormality, fracture, or dislocation.     Mild degenerative changes.  Predominantly of the proximal interphalangeal joints.    ASSESSMENT:   Right 5th finger cellulitis   HTN  HLD    PLAN:  Assessed right 5th finger, cellulitis with small opening, do not appreciate any drainable fluid collection.   No indication for surgery at this time.   Monitor clinically, currently no WBCs or fever.   Recommend antibiotics per primary pending culture results.   Recommend daily warm/soapy water soaks followed by wrapping hand and elevation to assist with edema.       Thank you for your consult.    JOSE ANGEL Howard  Plastic Surgery  [facility] - 8th Floor Med Surg

## 2023-04-20 NOTE — PLAN OF CARE
Problem: Adult Inpatient Plan of Care  Goal: Plan of Care Review  Outcome: Ongoing, Progressing  Goal: Patient-Specific Goal (Individualized)  Outcome: Ongoing, Progressing  Goal: Absence of Hospital-Acquired Illness or Injury  Outcome: Ongoing, Progressing  Goal: Optimal Comfort and Wellbeing  Outcome: Ongoing, Progressing  Goal: Readiness for Transition of Care  Outcome: Ongoing, Progressing     Problem: Impaired Wound Healing  Goal: Optimal Wound Healing  Outcome: Ongoing, Progressing     Problem: Infection  Goal: Absence of Infection Signs and Symptoms  Outcome: Ongoing, Progressing

## 2023-04-20 NOTE — PROGRESS NOTES
[facility]  HOS[facility] MEDICINE  PROGRESS NOTE        CHIEF COMPLAINT   Hospital follow up    HOSPITAL COURSE   Efe Griffin is a 58 y.o. male who past medical history includes HTN, HLD; presents to the ED at [facility] on 4/19/2023 with a primary complaint of  right 5th finger wound infection. Pt reports injuring his right 5 th finger with crawfish equipment. Pt was seen at his PCP which he received tetanus shot and was directed to come to the ED for further evaluation. Pt reports he injure his 5th finger about 6 days ago which he reports the wound started to worsening over the past few days which he went to his PCP and was directed to come to the ED for evaluation. ED provider spoke to orthopedic on call which patient was referred to plastics who will see patient in consult. Labs demonstrated glucose 118, other indicis unremarkable.  X-ray of right hand demonstrated no acute osseous abnormality, fracture, or dislocation, mild degenerative changes predominantly of the proximal interphalangeal joints.    Initial vital signs /90 pulse 63 respirations 18 temperature 98.4° F O2 saturation 97% on room air.  Wound culture and blood cultures were collected in the ED. patient was started on IV doxycycline, Zosyn, and vancomycin therapy, pharmacy has been consulted for dosing.  Per ED provider plastics would like patient NPO after midnight for surgical intervention.  Patient is admitted to hospital medicine services for further management.    Today  Seen by Plastic surgery and will not require any surgical intervention as of right now.  I will also get a MRI to make sure there is no bone or joint involvement given that this was related to a puncture wound.  That will determine the duration of antibiotics.        OBJECTIVE/PHYSICAL EXAM     VITAL SIGNS (MOST RECENT):  Temp: 97.9 °F (36.6 °C) (04/20/23 0835)  Pulse: (!) 56 (04/20/23 0835)  Resp: 18 (04/20/23 0835)  BP: (!) 145/79 (04/20/23  0835)  SpO2: 96 % (04/20/23 0835)   VITAL SIGNS (24 HOUR RANGE):  Temp:  [97.7 °F (36.5 °C)-98.4 °F (36.9 °C)] 97.9 °F (36.6 °C)  Pulse:  [50-63] 56  Resp:  [16-18] 18  SpO2:  [95 %-99 %] 96 %  BP: (129-166)/(72-93) 145/79   GENERAL: In no acute distress, afebrile  HEENT:  CHEST: Clear to auscultation bilaterally  HEART: S1, S2, no appreciable murmur  ABDOMEN: Soft, nontender, BS +  MSK: Warm, no lower extremity edema, no clubbing or cyanosis  NEUROLOGIC: Alert and oriented x4, moving all extremities with good strength   INTEGUMENTARY:  Refer to images regarding the right 5th digit  PSYCHIATRY:        ASSESSMENT/PLAN   Right 5th digit puncture wound infection 2/to crawfish/brackish water    History of: Essential hypertension      Plastic surgery following.  Continue to monitor and antibiotics.    Continue vancomycin Zosyn and doxycycline.  Check MRSA PCR and if negative discontinue vancomycin.  Obtain MRI to rule out deep space infection/osteo and joint space    DVT prophylaxis:  Lovenox 40    Anticipated discharge and disposition:   __________________________________________________________________________    LABS/MICRO/MEDS/DIAGNOSTICS       LABS  Recent Labs     04/20/23  0351      K 4.2   CHLORIDE 107   CO2 25   BUN 14.1   CREATININE 0.90   GLUCOSE 105   CALCIUM 8.9   ALKPHOS 57   AST 22   ALT 22   ALBUMIN 3.7     Recent Labs     04/20/23  0351   WBC 6.6   RBC 4.89   HCT 44.3   MCV 90.6          MICROBIOLOGY  Microbiology Results (last 7 days)       Procedure Component Value Units Date/Time    Wound Culture [[id number]] Collected: 04/19/23 1830    Order Status: Sent Specimen: Wound from Finger, Right Hand Updated: 04/19/23 1847    Blood Culture [[id number]] Collected: 04/19/23 1328    Order Status: Resulted Specimen: Blood Updated: 04/19/23 1343    Blood Culture [[id number]] Collected: 04/19/23 1328    Order Status: Resulted Specimen: Blood Updated: 04/19/23 1343               MEDICATIONS   doxycycline  (VIBRAMYCIN) IVPB  100 mg Intravenous Q12H    enoxaparin  40 mg Subcutaneous Daily    piperacillin-tazobactam (ZOSYN) IVPB  4.5 g Intravenous Q8H    vancomycin (VANCOCIN) IVPB  1,250 mg Intravenous Q12H         INFUSIONS         DIAGNOSTIC TESTS  X-Ray Hand 3 view Right   Final Result      No acute osseous abnormality, fracture, or dislocation.      Mild degenerative changes.  Predominantly of the proximal interphalangeal joints.         Electronically signed by: Rodolfo Nelson   Date:    04/19/2023   Time:    13:50      MRI Hand Fingers W WO Contrast Right    (Results Pending)        No results found for: EF           Case related differential diagnoses have been reviewed; assessment and plan has been documented. I have personally reviewed the labs and test results that are currently available; I have reviewed the patients medication list. I have reviewed the consulting providers recommendations. I have reviewed or attempted to review medical records based upon their availability.  All of the patient's and/or family's questions have been addressed and answered to the best of my ability.  I will continue to monitor closely and make adjustments to medical management as needed.  This document was created using M*Modal Fluency Direct.  Transcription errors may have been made.  Please contact me if any questions may rise regarding documentation to clarify transcription.        Nabor Herring MD   04/20/2023   Internal Medicine

## 2023-04-21 VITALS
TEMPERATURE: 98 F | SYSTOLIC BLOOD PRESSURE: 146 MMHG | WEIGHT: 195.75 LBS | HEART RATE: 50 BPM | DIASTOLIC BLOOD PRESSURE: 87 MMHG | OXYGEN SATURATION: 97 % | HEIGHT: 64 IN | RESPIRATION RATE: 18 BRPM | BODY MASS INDEX: 33.42 KG/M2

## 2023-04-21 PROBLEM — L08.9 SOFT TISSUE INFECTION: Status: ACTIVE | Noted: 2023-04-21

## 2023-04-21 PROCEDURE — 99231 SBSQ HOSP IP/OBS SF/LOW 25: CPT | Mod: ,,, | Performed by: SURGERY

## 2023-04-21 PROCEDURE — 63600175 PHARM REV CODE 636 W HCPCS: Performed by: NURSE PRACTITIONER

## 2023-04-21 PROCEDURE — 99231 PR SUBSEQUENT HOSPITAL CARE,LEVL I: ICD-10-PCS | Mod: ,,, | Performed by: SURGERY

## 2023-04-21 PROCEDURE — A9577 INJ MULTIHANCE: HCPCS | Performed by: STUDENT IN AN ORGANIZED HEALTH CARE EDUCATION/TRAINING PROGRAM

## 2023-04-21 PROCEDURE — 25500020 PHARM REV CODE 255: Performed by: STUDENT IN AN ORGANIZED HEALTH CARE EDUCATION/TRAINING PROGRAM

## 2023-04-21 PROCEDURE — 25000003 PHARM REV CODE 250: Performed by: NURSE PRACTITIONER

## 2023-04-21 PROCEDURE — 25000003 PHARM REV CODE 250: Performed by: INTERNAL MEDICINE

## 2023-04-21 RX ORDER — SULFAMETHOXAZOLE AND TRIMETHOPRIM 800; 160 MG/1; MG/1
1 TABLET ORAL 2 TIMES DAILY
Qty: 14 TABLET | Refills: 0 | Status: SHIPPED | OUTPATIENT
Start: 2023-04-21 | End: 2023-04-28

## 2023-04-21 RX ORDER — SULFAMETHOXAZOLE AND TRIMETHOPRIM 800; 160 MG/1; MG/1
1 TABLET ORAL 2 TIMES DAILY
Status: DISCONTINUED | OUTPATIENT
Start: 2023-04-21 | End: 2023-04-21 | Stop reason: HOSPADM

## 2023-04-21 RX ORDER — LISINOPRIL 20 MG/1
40 TABLET ORAL DAILY
Status: DISCONTINUED | OUTPATIENT
Start: 2023-04-21 | End: 2023-04-21 | Stop reason: HOSPADM

## 2023-04-21 RX ORDER — AMLODIPINE BESYLATE 5 MG/1
10 TABLET ORAL DAILY
Status: DISCONTINUED | OUTPATIENT
Start: 2023-04-21 | End: 2023-04-21 | Stop reason: HOSPADM

## 2023-04-21 RX ADMIN — LISINOPRIL 40 MG: 20 TABLET ORAL at 08:04

## 2023-04-21 RX ADMIN — GADOBENATE DIMEGLUMINE 19 ML: 529 INJECTION, SOLUTION INTRAVENOUS at 01:04

## 2023-04-21 RX ADMIN — DOXYCYCLINE HYCLATE 100 MG: 100 TABLET, COATED ORAL at 08:04

## 2023-04-21 RX ADMIN — AMLODIPINE BESYLATE 10 MG: 5 TABLET ORAL at 08:04

## 2023-04-21 RX ADMIN — PIPERACILLIN AND TAZOBACTAM 4.5 G: 4; .5 INJECTION, POWDER, FOR SOLUTION INTRAVENOUS; PARENTERAL at 02:04

## 2023-04-21 RX ADMIN — ENOXAPARIN SODIUM 40 MG: 40 INJECTION SUBCUTANEOUS at 04:04

## 2023-04-21 RX ADMIN — PIPERACILLIN AND TAZOBACTAM 4.5 G: 4; .5 INJECTION, POWDER, FOR SOLUTION INTRAVENOUS; PARENTERAL at 10:04

## 2023-04-21 NOTE — PROGRESS NOTES
PRS  Pt feeling better overall, would like to go home  AFVSS  Right small finger cellulitis is much improved, less, nearly resolved erythema and swelling, full motion, sensation intact, no signs of abscess    A/P:  Notable improvement in cellulitis.  Pt in my opinion is stable for discharge with PO abx.  No indication for surgical drainage.  Suggest pt not return to work for at least one week.  Suggest BID warm soapy water soaks of left hand for 15 minutes.  Return to ED with any worsening.

## 2023-04-21 NOTE — NURSING
Patient discharged home. All discharge instructions, Rx and follow ups given and explained to patient and spouse. Patient and spouse was educated on soaking left hand in warm soapy water for 15 minutes. Basin and baby soap was given to patient at discharge. Patient in stable condition. All questions answered.

## 2023-04-22 LAB
BACTERIA SPEC CULT: ABNORMAL
BACTERIA SPEC CULT: ABNORMAL

## 2023-04-24 ENCOUNTER — PATIENT OUTREACH (OUTPATIENT)
Dept: ADMINISTRATIVE | Facility: CLINIC | Age: 58
End: 2023-04-24
Payer: COMMERCIAL

## 2023-04-24 LAB
BACTERIA BLD CULT: NORMAL
BACTERIA BLD CULT: NORMAL

## 2023-04-24 RX ORDER — ACETAMINOPHEN 500 MG
1000 TABLET ORAL
COMMUNITY

## 2023-04-24 NOTE — PROGRESS NOTES
C3 nurse spoke with Efe Griffin  for a TCC post hospital discharge follow up call. The patient has a scheduled HOS appointment with Mickey Sloan Jr, MD  on 04/25/2023.       Patient taking;  Multivitamin (1) tablet daily

## 2023-12-27 DIAGNOSIS — H49.12: Primary | ICD-10-CM

## 2023-12-28 ENCOUNTER — LAB VISIT (OUTPATIENT)
Dept: LAB | Facility: HOSPITAL | Age: 58
End: 2023-12-28
Attending: OPTOMETRIST
Payer: COMMERCIAL

## 2023-12-28 DIAGNOSIS — H49.12: Primary | ICD-10-CM

## 2023-12-28 LAB
BASOPHILS # BLD AUTO: 0.04 X10(3)/MCL
BASOPHILS NFR BLD AUTO: 0.6 %
BUN SERPL-MCNC: 12.3 MG/DL (ref 8.4–25.7)
CREAT SERPL-MCNC: 0.89 MG/DL (ref 0.73–1.18)
CRP SERPL-MCNC: 3.1 MG/L
EOSINOPHIL # BLD AUTO: 0.41 X10(3)/MCL (ref 0–0.9)
EOSINOPHIL NFR BLD AUTO: 6.4 %
ERYTHROCYTE [DISTWIDTH] IN BLOOD BY AUTOMATED COUNT: 12.9 % (ref 11.5–17)
ERYTHROCYTE [SEDIMENTATION RATE] IN BLOOD: 16 MM/HR (ref 0–15)
GFR SERPLBLD CREATININE-BSD FMLA CKD-EPI: >60 MLS/MIN/1.73/M2
HCT VFR BLD AUTO: 49 % (ref 42–52)
HGB BLD-MCNC: 15.6 G/DL (ref 14–18)
IMM GRANULOCYTES # BLD AUTO: 0.07 X10(3)/MCL (ref 0–0.04)
IMM GRANULOCYTES NFR BLD AUTO: 1.1 %
LYMPHOCYTES # BLD AUTO: 1.55 X10(3)/MCL (ref 0.6–4.6)
LYMPHOCYTES NFR BLD AUTO: 24.3 %
MCH RBC QN AUTO: 29.1 PG (ref 27–31)
MCHC RBC AUTO-ENTMCNC: 31.8 G/DL (ref 33–36)
MCV RBC AUTO: 91.2 FL (ref 80–94)
MONOCYTES # BLD AUTO: 0.82 X10(3)/MCL (ref 0.1–1.3)
MONOCYTES NFR BLD AUTO: 12.9 %
NEUTROPHILS # BLD AUTO: 3.48 X10(3)/MCL (ref 2.1–9.2)
NEUTROPHILS NFR BLD AUTO: 54.7 %
PLATELET # BLD AUTO: 215 X10(3)/MCL (ref 130–400)
PMV BLD AUTO: 9.9 FL (ref 7.4–10.4)
RBC # BLD AUTO: 5.37 X10(6)/MCL (ref 4.7–6.1)
WBC # SPEC AUTO: 6.37 X10(3)/MCL (ref 4.5–11.5)

## 2023-12-28 PROCEDURE — 36415 COLL VENOUS BLD VENIPUNCTURE: CPT

## 2023-12-28 PROCEDURE — 82565 ASSAY OF CREATININE: CPT

## 2023-12-28 PROCEDURE — 86140 C-REACTIVE PROTEIN: CPT

## 2023-12-28 PROCEDURE — 85025 COMPLETE CBC W/AUTO DIFF WBC: CPT

## 2023-12-28 PROCEDURE — 84520 ASSAY OF UREA NITROGEN: CPT

## 2023-12-28 PROCEDURE — 85652 RBC SED RATE AUTOMATED: CPT

## 2023-12-29 ENCOUNTER — HOSPITAL ENCOUNTER (OUTPATIENT)
Dept: RADIOLOGY | Facility: HOSPITAL | Age: 58
Discharge: HOME | End: 2023-12-29
Attending: OPTOMETRIST
Payer: COMMERCIAL

## 2023-12-29 DIAGNOSIS — H49.12: ICD-10-CM

## 2023-12-29 PROCEDURE — 25500020 PHARM REV CODE 255

## 2023-12-29 PROCEDURE — 70482 CT ORBIT/EAR/FOSSA W/O&W/DYE: CPT | Mod: TC

## 2023-12-29 PROCEDURE — 70470 CT HEAD/BRAIN W/O & W/DYE: CPT | Mod: TC

## 2023-12-29 RX ADMIN — IOPAMIDOL 100 ML: 755 INJECTION, SOLUTION INTRAVENOUS at 01:12

## 2024-03-19 ENCOUNTER — LAB VISIT (OUTPATIENT)
Dept: LAB | Facility: HOSPITAL | Age: 59
End: 2024-03-19
Attending: FAMILY MEDICINE
Payer: COMMERCIAL

## 2024-03-19 DIAGNOSIS — Z12.5 SPECIAL SCREENING FOR MALIGNANT NEOPLASM OF PROSTATE: ICD-10-CM

## 2024-03-19 DIAGNOSIS — I10 ESSENTIAL HYPERTENSION, MALIGNANT: ICD-10-CM

## 2024-03-19 DIAGNOSIS — Z00.00 ROUTINE GENERAL MEDICAL EXAMINATION AT A HEALTH CARE FACILITY: ICD-10-CM

## 2024-03-19 DIAGNOSIS — H49.12: Primary | ICD-10-CM

## 2024-03-19 DIAGNOSIS — E78.5 HYPERLIPIDEMIA, UNSPECIFIED HYPERLIPIDEMIA TYPE: ICD-10-CM

## 2024-03-19 LAB
ALBUMIN SERPL-MCNC: 4 G/DL (ref 3.4–4.8)
ALP SERPL-CCNC: 57 UNIT/L (ref 40–150)
ALT SERPL-CCNC: 28 UNIT/L (ref 0–55)
ANION GAP SERPL CALC-SCNC: 11 MEQ/L
AST SERPL-CCNC: 27 UNIT/L (ref 5–34)
BASOPHILS # BLD AUTO: 0.04 X10(3)/MCL
BASOPHILS NFR BLD AUTO: 0.5 %
BILIRUB SERPL-MCNC: 0.6 MG/DL
BILIRUBIN DIRECT+TOT PNL SERPL-MCNC: 0.2 MG/DL (ref 0–?)
BILIRUBIN DIRECT+TOT PNL SERPL-MCNC: 0.4 MG/DL (ref 0–0.8)
BUN SERPL-MCNC: 15.7 MG/DL (ref 8.4–25.7)
CALCIUM SERPL-MCNC: 9.4 MG/DL (ref 8.8–10)
CHLORIDE SERPL-SCNC: 105 MMOL/L (ref 98–107)
CHOLEST SERPL-MCNC: 166 MG/DL
CHOLEST/HDLC SERPL: 4 {RATIO} (ref 0–5)
CO2 SERPL-SCNC: 26 MMOL/L (ref 23–31)
CREAT SERPL-MCNC: 1.19 MG/DL (ref 0.73–1.18)
CREAT/UREA NIT SERPL: 13
EOSINOPHIL # BLD AUTO: 0.68 X10(3)/MCL (ref 0–0.9)
EOSINOPHIL NFR BLD AUTO: 8.3 %
ERYTHROCYTE [DISTWIDTH] IN BLOOD BY AUTOMATED COUNT: 13.1 % (ref 11.5–17)
FT4I SERPL CALC-MCNC: 2.56 (ref 2.6–3.6)
GFR SERPLBLD CREATININE-BSD FMLA CKD-EPI: >60 MLS/MIN/1.73/M2
GLUCOSE SERPL-MCNC: 97 MG/DL (ref 82–115)
HCT VFR BLD AUTO: 48.9 % (ref 42–52)
HDLC SERPL-MCNC: 43 MG/DL (ref 35–60)
HGB BLD-MCNC: 16 G/DL (ref 14–18)
IMM GRANULOCYTES # BLD AUTO: 0.08 X10(3)/MCL (ref 0–0.04)
IMM GRANULOCYTES NFR BLD AUTO: 1 %
LDLC SERPL CALC-MCNC: 75 MG/DL (ref 50–140)
LYMPHOCYTES # BLD AUTO: 1.59 X10(3)/MCL (ref 0.6–4.6)
LYMPHOCYTES NFR BLD AUTO: 19.5 %
MCH RBC QN AUTO: 29.6 PG (ref 27–31)
MCHC RBC AUTO-ENTMCNC: 32.7 G/DL (ref 33–36)
MCV RBC AUTO: 90.6 FL (ref 80–94)
MONOCYTES # BLD AUTO: 0.92 X10(3)/MCL (ref 0.1–1.3)
MONOCYTES NFR BLD AUTO: 11.3 %
NEUTROPHILS # BLD AUTO: 4.86 X10(3)/MCL (ref 2.1–9.2)
NEUTROPHILS NFR BLD AUTO: 59.4 %
PLATELET # BLD AUTO: 218 X10(3)/MCL (ref 130–400)
PMV BLD AUTO: 10 FL (ref 7.4–10.4)
POTASSIUM SERPL-SCNC: 4.5 MMOL/L (ref 3.5–5.1)
PROT SERPL-MCNC: 7.1 GM/DL (ref 5.8–7.6)
PSA SERPL-MCNC: 1.27 NG/ML
RBC # BLD AUTO: 5.4 X10(6)/MCL (ref 4.7–6.1)
SODIUM SERPL-SCNC: 142 MMOL/L (ref 136–145)
T3RU NFR SERPL: 36.58 % (ref 31–39)
T4 SERPL-MCNC: 6.99 UG/DL (ref 4.87–11.72)
TRIGL SERPL-MCNC: 238 MG/DL (ref 34–140)
TSH SERPL-ACNC: 2.77 UIU/ML (ref 0.35–4.94)
VLDLC SERPL CALC-MCNC: 48 MG/DL
WBC # SPEC AUTO: 8.17 X10(3)/MCL (ref 4.5–11.5)

## 2024-03-19 PROCEDURE — 84443 ASSAY THYROID STIM HORMONE: CPT

## 2024-03-19 PROCEDURE — 84153 ASSAY OF PSA TOTAL: CPT

## 2024-03-19 PROCEDURE — 80061 LIPID PANEL: CPT

## 2024-03-19 PROCEDURE — 84436 ASSAY OF TOTAL THYROXINE: CPT

## 2024-03-19 PROCEDURE — 80076 HEPATIC FUNCTION PANEL: CPT

## 2024-03-19 PROCEDURE — 36415 COLL VENOUS BLD VENIPUNCTURE: CPT

## 2024-03-19 PROCEDURE — 85025 COMPLETE CBC W/AUTO DIFF WBC: CPT

## 2024-03-19 PROCEDURE — 80048 BASIC METABOLIC PNL TOTAL CA: CPT
